# Patient Record
(demographics unavailable — no encounter records)

---

## 2024-11-15 NOTE — REVIEW OF SYSTEMS
[FreeTextEntry7] : LLQ abdominal pain as described above [FreeTextEntry8] : Vaginal discharge.  Urinary frequency

## 2024-11-15 NOTE — HISTORY OF PRESENT ILLNESS
[Patient reported PAP Smear was normal] : Patient reported PAP Smear was normal [N] : Patient does not use contraception [Y] : Positive pregnancy history [Normal Amount/Duration] :  normal amount and duration [Regular Cycle Intervals] : periods have been regular [Frequency: Q ___ days] : menstrual periods occur approximately every [unfilled] days [Menarche Age: ____] : age at menarche was [unfilled] [Currently Active] : currently active [Men] : men [No] : No [TextBox_4] :  41-year old  4 para 1 LMP 2024, presents for an annual examination. Patient presents complaining of urinary frequency, vaginal discharge and LLQ abdominal pain.  She describes the abdominal pain as a constant pressure sensation which is nonradiating.  She has noted the pain for approximately 1 month.  The intensity is 7 out of 10.  She has not self medicated. [PapSmeardate] : 6/2023 [LMPDate] : 10/31/2024 [MensesFreq] : 28 [MensesLength] : 5 [PGHxTotal] : 4 [[facility]] : [facility] [PGHxAbortions] : 2 [[facility] [place]] : 1 [PGHxABInduced] : 2 [PGHxABSpont] : 1 [FreeTextEntry1] : 10/31/2024

## 2024-11-15 NOTE — PLAN
[FreeTextEntry1] : Wellness exam. Normal physical examination. Recommendations: Mammography, ophthalmological, eye and dermatological examinations.  Declines contraception. Information given on Gardasil vaccination.  Left lower quadrant pain.  Benign physical exam.  Will obtain transvaginal pelvic ultrasound. Vaginal discharge.  Cultures pending. Urinary frequency.  Will obtain urinalysis and urine culture.  Discussed proper nutrition and physical exercise. Reviewed age-appropriate vaccinations.

## 2024-11-15 NOTE — PHYSICAL EXAM
[Chaperone Present] : A chaperone was present in the examining room during all aspects of the physical examination [Oriented x3] : oriented x3 [Examination Of The Breasts] : a normal appearance [No Masses] : no breast masses were palpable [Labia Majora] : normal [Labia Minora] : normal [Normal] : normal [Uterine Adnexae] : normal [FreeTextEntry2] :  Appropriately responsive, alert, and no acute distress. [FreeTextEntry3] :  Thyroid is non-enlarged, nontender. No palpable nodules or goiter. No lymphadenopathy. [FreeTextEntry7] :  Soft. Nondistended. Nontender. No rebound or guarding. There are no palpable masses. [FreeTextEntry1] :  External genitalia are within normal limits with no lesions visualized. [FreeTextEntry4] : Scant amount of white discharge. No blood or lesions noted within the vaginal vault. [FreeTextEntry5] :  A speculum was inserted without any difficulty. The cervix was normal in appearance. Pap smear and culture obtained. No cervical motion tenderness. [FreeTextEntry6] : Bimanual examination was notable for a normal, nontender uterus. There were no palpable adnexal masses or adnexal tenderness.

## 2024-11-22 NOTE — PROCEDURE
[Pelvic Pain] : pelvic pain [Transvaginal Ultrasound] : transvaginal ultrasound [Retroverted] : retroverted [No Fibroid(s)] : no fibroid(s) [L: ___ cm] : L: [unfilled] cm [W: ___cm] : W: [unfilled] cm [H: ___ cm] : H: [unfilled] cm [FreeTextEntry7] : 2.77 x 2.78 x 2.00 cm [FreeTextEntry8] : 3.11 x 2.33 x 2.01 cm [FreeTextEntry4] : As per Dr. Garcia, this transvaginal ultrasound was performed. The uterus is heterogeneous and retroverted. The endometrium is linear echogenic and measures 8.03 mm. The cervix measures 3.58 cm. The right ovary has a normal appearance. The left ovary has a normal appearance. No adnexal masses